# Patient Record
Sex: MALE | Race: WHITE | Employment: OTHER | URBAN - METROPOLITAN AREA
[De-identification: names, ages, dates, MRNs, and addresses within clinical notes are randomized per-mention and may not be internally consistent; named-entity substitution may affect disease eponyms.]

---

## 2019-03-04 ENCOUNTER — TRANSFERRED RECORDS (OUTPATIENT)
Dept: HEALTH INFORMATION MANAGEMENT | Facility: CLINIC | Age: 64
End: 2019-03-04

## 2019-03-20 ENCOUNTER — TRANSFERRED RECORDS (OUTPATIENT)
Dept: HEALTH INFORMATION MANAGEMENT | Facility: CLINIC | Age: 64
End: 2019-03-20

## 2019-03-25 ENCOUNTER — TRANSFERRED RECORDS (OUTPATIENT)
Dept: HEALTH INFORMATION MANAGEMENT | Facility: CLINIC | Age: 64
End: 2019-03-25

## 2019-04-24 ENCOUNTER — PRE VISIT (OUTPATIENT)
Dept: OPHTHALMOLOGY | Facility: CLINIC | Age: 64
End: 2019-04-24

## 2019-04-24 ENCOUNTER — DOCUMENTATION ONLY (OUTPATIENT)
Dept: CARE COORDINATION | Facility: CLINIC | Age: 64
End: 2019-04-24

## 2019-04-24 NOTE — TELEPHONE ENCOUNTER
FUTURE VISIT INFORMATION      FUTURE VISIT INFORMATION:    Date: 5/15/19    Time: 8:30am    Location: [facility]  REFERRAL INFORMATION:    Referring provider:  Miriam Hensley    Referring providers clinic:  [facility]    Reason for visit/diagnosis  Visual disturbance    RECORDS REQUESTED FROM:       Clinic name Comments Records Status Imaging Status   [facility] Request for records faxed 4/24- received and sent to scanning [facility]

## 2019-05-15 ENCOUNTER — OFFICE VISIT (OUTPATIENT)
Dept: OPHTHALMOLOGY | Facility: CLINIC | Age: 64
End: 2019-05-15
Payer: MEDICARE

## 2019-05-15 DIAGNOSIS — H53.40 VISUAL FIELD DEFECT: ICD-10-CM

## 2019-05-15 DIAGNOSIS — H53.10 SUBJECTIVE VISUAL DISTURBANCE: Primary | ICD-10-CM

## 2019-05-15 DIAGNOSIS — H25.13 NUCLEAR SCLEROSIS OF BOTH EYES: Primary | ICD-10-CM

## 2019-05-15 DIAGNOSIS — H53.10 SUBJECTIVE VISUAL DISTURBANCE: ICD-10-CM

## 2019-05-15 RX ORDER — TAMSULOSIN HYDROCHLORIDE 0.4 MG/1
0.4 CAPSULE ORAL DAILY
COMMUNITY
End: 2020-08-04

## 2019-05-15 RX ORDER — DIAZEPAM 5 MG
5 TABLET ORAL EVERY 6 HOURS PRN
COMMUNITY

## 2019-05-15 RX ORDER — SIMVASTATIN 40 MG
40 TABLET ORAL AT BEDTIME
COMMUNITY

## 2019-05-15 RX ORDER — ZOLPIDEM TARTRATE 10 MG/1
10 TABLET ORAL
COMMUNITY

## 2019-05-15 ASSESSMENT — VISUAL ACUITY
CORRECTION_TYPE: GLASSES
OS_CC: 20/20
OD_CC: 20/20
OD_CC: 20/20
OS_CC: 20/20
METHOD: SNELLEN - LINEAR

## 2019-05-15 ASSESSMENT — REFRACTION_WEARINGRX
OD_CYLINDER: +0.75
OS_CYLINDER: +1.00
OS_AXIS: 180
OD_AXIS: 002
OS_SPHERE: -0.75
OD_ADD: +2.50
OD_SPHERE: -0.50
SPECS_TYPE: LAST MR IN PHOROPTER
OS_ADD: +2.50

## 2019-05-15 ASSESSMENT — SLIT LAMP EXAM - LIDS
COMMENTS: MILD BLEPHARITIS
COMMENTS: MILD BLEPHARITIS

## 2019-05-15 ASSESSMENT — TONOMETRY
IOP_METHOD: ICARE
OD_IOP_MMHG: 13
OS_IOP_MMHG: 11

## 2019-05-15 ASSESSMENT — CONF VISUAL FIELD
OS_NORMAL: 1
OD_NORMAL: 1
METHOD: COUNTING FINGERS

## 2019-05-15 ASSESSMENT — CUP TO DISC RATIO
OD_RATIO: 0.3
OS_RATIO: 0.3

## 2019-05-15 NOTE — LETTER
5/15/2019         RE:  :  MRN: Jose Antonio Talavera  1955  [id number]     Dear Dr. Miriam Hensley,    Thank you for asking me to see your very pleasant patient, Jose Antonio Talavera, in neuro-ophthalmic consultation.  I would like to thank you for sending your records and I have summarized them in the history of present illness. He presented with his {Family Member:[id number]} who provided additional history.  My assessment and plan are below.  For further details, please see my attached clinic note.           Again, thank you for allowing me to participate in the care of your patient.      Sincerely,    Brian Lee MD  Professor  Ophthalmology Residency   Director of Neuro-Ophthalmology  [facility] Chair  Departments of Ophthalmology, Neurology, and Neurosurgery  [address]  [address]  T - [phone]  F - [phone]  [email or web address]      CC: Miriam Hensley MD  [facility]  [address]  [facility] [address]  VIA Facsimile: [phone]     Pedro Luis Garza DO  [facility]  [address]  [facility] [address]  VIA Facsimile: [phone]       DX = ***

## 2019-05-15 NOTE — LETTER
5/15/2019         RE:  :  MRN: Jose Antonio Talavera  1955  [id number]     Dear Dr. Hensley,    Thank you for asking me to see your very pleasant patient, Jose Antonio Talavera, in neuro-ophthalmic consultation.  I would like to thank you for sending your records and I have summarized them in the history of present illness.  My assessment and plan are below.  For further details, please see my attached clinic note.         Assessment & Plan     Jose Antonio Talavera is a 64 year old male with the following diagnoses:   1. Nuclear sclerosis of both eyes    2. Visual field defect    3. Subjective visual disturbance       He developed ringing in the ears in mid January.  A couple of weeks later he developed lightheadedness.  A few weeks later has noted blurred vision.  His vision in the distance seems better than before. This was without glasses.  He was having difficulty seeing near.  He got a new pair orthophoric glasses which don't make much difference.  He cannot remember seeing so well into the distance.  However, his near vision got worse.  This seemed to get worse for a few months and has been stable for the past 6 weeks or so.  He seems sensitive to sun and fluorescent lights.      Visual acuity 20/20 both eyes.  Pupils normal with no afferent pupillary defect both eyes.  color vision normal both eyes.  Anterior segment exam shows clear LASIK flap both eyes.  Lens with 1-2+ nuclear sclerosis both eyes.  He also has mild blepharitis both eyes.  Fundus exam normal both eyes. Optical coherence tomography within normal limits both eyes.  Visual field shows nonspecific defects right and normal left eye.      It is my impression that patient has blurred vision.  We discussed possible etiologies including regressive of previous refractive surgery versus cataracts.  He is also having difficulty adjusting to his progressive lenses.  Could consider cataract surgery in the future if becomes more bothersome.  I don't see a  connection with his dizziness and tinnitus.  He does not have nystagmus and denies oscillopsia.       Again, thank you for allowing me to participate in the care of your patient.      Sincerely,    Brian Lee MD  Professor  Ophthalmology Residency   Director of Neuro-Ophthalmology  [facility]  Departments of Ophthalmology, Neurology, and Neurosurgery  [facility] [address] [place]  [address]  T - [phone]  F - [phone]  [email or web address]      CC: Miriam Hensley MD  [facility]  [address] [address]  VIA Facsimile: [phone]     Pedro Luis Garza, DO  [facility]  [address]  [facility] [address]  VIA Facsimile: [phone]

## 2019-05-15 NOTE — PROGRESS NOTES
Assessment & Plan     Jose Antonio Talavera is a 64 year old male with the following diagnoses:   1. Nuclear sclerosis of both eyes    2. Visual field defect    3. Subjective visual disturbance       He developed ringing in the ears in mid January.  A couple of weeks later he developed lightheadedness.  A few weeks later has noted blurred vision.  His vision in the distance seems better than before. This was without glasses.  He was having difficulty seeing near.  He got a new pair orthophoric glasses which don't make much difference.  He cannot remember seeing so well into the distance.  However, his near vision got worse.  This seemed to get worse for a few months and has been stable for the past 6 weeks or so.  He seems sensitive to sun and fluorescent lights.      Visual acuity 20/20 both eyes.  Pupils normal with no afferent pupillary defect both eyes.  color vision normal both eyes.  Anterior segment exam shows clear LASIK flap both eyes.  Lens with 1-2+ nuclear sclerosis both eyes.  He also has mild blepharitis both eyes.  Fundus exam normal both eyes. Optical coherence tomography within normal limits both eyes.  Visual field shows nonspecific defects right and normal left eye.      It is my impression that patient has blurred vision.  We discussed possible etiologies including regressive of previous refractive surgery versus cataracts.  He is also having difficulty adjusting to his progressive lenses.  Could consider cataract surgery in the future if becomes more bothersome.  I don't see a connection with his dizziness and tinnitus.  He does not have nystagmus and denies oscillopsia.            Attending Physician Attestation:  Complete documentation of historical and exam elements from today's encounter can be found in the full encounter summary report (not reduplicated in this progress note).  I personally obtained the chief complaint(s) and history of present illness.  I confirmed and edited as  necessary the review of systems, past medical/surgical history, family history, social history, and examination findings as documented by others; and I examined the patient myself.  I personally reviewed the relevant tests, images, and reports as documented above.  I formulated and edited as necessary the assessment and plan and discussed the findings and management plan with the patient and family. - Brian Lee MD

## 2019-05-15 NOTE — NURSING NOTE
Chief Complaints and History of Present Illnesses   Patient presents with     Blurred Vision Evaluation     With pulsatile synchronous tinnitus     Chief Complaint(s) and History of Present Illness(es)     Blurred Vision Evaluation     Laterality: both eyes    Onset: gradual    Onset: 4 months ago    Quality: blurred vision and difficulty focusing    Frequency: intermittently    Associated symptoms: photophobia, nausea and headache (in the back of the skull per pt).  Negative for double vision, flashes, floaters, eye pain, scalp tenderness and fatigue    Comments: With pulsatile synchronous tinnitus              Comments     Patient notes that vision has changed he was unable to see in the distance, now he is able to, but now unable to 'focus at near', first noticed when driving, but felt that eyes weren't working together, felt depth perception was off somehow, causes nausea, lightheadedness, denies dizziness or vertigo, notes that it is different every day. Patient has been to  for PT, has been doing 'pencil pushups', unsure if it helps, can feel it working his muscle.  No history of head injury.     Jyothi Reyna May 15, 2019 7:58 AM

## 2019-12-22 ENCOUNTER — HOSPITAL ENCOUNTER (EMERGENCY)
Facility: CLINIC | Age: 64
Discharge: HOME | End: 2019-12-22
Attending: EMERGENCY MEDICINE | Admitting: EMERGENCY MEDICINE
Payer: MEDICARE

## 2019-12-22 ENCOUNTER — OFFICE VISIT (OUTPATIENT)
Dept: OPHTHALMOLOGY | Facility: CLINIC | Age: 64
End: 2019-12-22

## 2019-12-22 VITALS
HEART RATE: 65 BPM | TEMPERATURE: 98.2 F | SYSTOLIC BLOOD PRESSURE: 135 MMHG | RESPIRATION RATE: 16 BRPM | HEIGHT: 68 IN | WEIGHT: 175 LBS | OXYGEN SATURATION: 98 % | BODY MASS INDEX: 26.52 KG/M2 | DIASTOLIC BLOOD PRESSURE: 92 MMHG

## 2019-12-22 DIAGNOSIS — H43.813 PVD (POSTERIOR VITREOUS DETACHMENT), BILATERAL: Primary | ICD-10-CM

## 2019-12-22 DIAGNOSIS — H25.13 NUCLEAR SCLEROSIS OF BOTH EYES: ICD-10-CM

## 2019-12-22 DIAGNOSIS — Z98.890 HX OF LASIK: ICD-10-CM

## 2019-12-22 DIAGNOSIS — H53.19 PHOTOPSIA: ICD-10-CM

## 2019-12-22 DIAGNOSIS — H43.391 VITREOUS FLOATERS OF RIGHT EYE: ICD-10-CM

## 2019-12-22 PROCEDURE — 99284 EMERGENCY DEPT VISIT MOD MDM: CPT | Mod: 25

## 2019-12-22 PROCEDURE — 76512 OPH US DX B-SCAN: CPT | Mod: RT

## 2019-12-22 RX ORDER — PROPARACAINE HYDROCHLORIDE 5 MG/ML
1 SOLUTION/ DROPS OPHTHALMIC ONCE
Status: DISCONTINUED | OUTPATIENT
Start: 2019-12-22 | End: 2019-12-22 | Stop reason: HOSPADM

## 2019-12-22 ASSESSMENT — CONF VISUAL FIELD
OS_NORMAL: 1
OD_NORMAL: 1

## 2019-12-22 ASSESSMENT — ENCOUNTER SYMPTOMS: EYE PAIN: 0

## 2019-12-22 ASSESSMENT — CUP TO DISC RATIO
OS_RATIO: 0.3
OD_RATIO: 0.3

## 2019-12-22 ASSESSMENT — SLIT LAMP EXAM - LIDS
COMMENTS: MILD BLEPHARITIS
COMMENTS: MILD BLEPHARITIS

## 2019-12-22 ASSESSMENT — VISUAL ACUITY
METHOD: SNELLEN - LINEAR
OD_SC: 20/20
OS_SC: 20/20
OS_SC+: -1

## 2019-12-22 ASSESSMENT — TONOMETRY
IOP_METHOD: APPLANATION
OD_IOP_MMHG: 13
OS_IOP_MMHG: 15

## 2019-12-22 ASSESSMENT — MIFFLIN-ST. JEOR: SCORE: 1553.29

## 2019-12-22 ASSESSMENT — EXTERNAL EXAM - LEFT EYE: OS_EXAM: NORMAL

## 2019-12-22 ASSESSMENT — EXTERNAL EXAM - RIGHT EYE: OD_EXAM: NORMAL

## 2019-12-22 NOTE — ED PROVIDER NOTES
"History     Chief Complaint:  Eye Problem     HPI  Jose Antonio Talavera is a 64 year old male with history of lasik who presents today with eye problem. The patient states that for the last 24 hours he has noticed intermittent flashing in his right upper quadrant of the right eye, similar to a \"camera-flash\" that lasts a brief second. He states associated floaters in his eye that are different in size. He states his symptoms exacerbates with standing up and are relieved with laying down. He states his hypertension is newly controlled and is still being monitored for medication effectiveness. He denies other trouble with vision, eye pain or eye trauma.     Allergies:  Ciprofloxacin  Sulfa Drugs   Lisinopril       Losartan        Medications:    Valium  Zocor  Flomax  Ambien    Past Medical History:    Acute adjustment disorder  Hyperglycemia   BPH (benign prostatic hyperplasia)   Insomnia   Upper limb amputation  Hyperlipidemia   Lumbago   Diverticulosis  Hernia    Past Surgical History:    Lasik   Tonsillectomy & Adenoidectomy   Hernia repair  Radial keratotomy     Family History:    Father - lung cancer  Mother - breast cancer, hyperlipidemia  Sister - breast cancer    Social History:  The patient was accompanied to the ED alone.  Smoking Status: Never  Smokeless Tobacco: Never  Alcohol Use: Yes   PCP: Pedro Luis Garza   Marital Status:     Review of Systems   Eyes: Positive for visual disturbance. Negative for pain.   All other systems reviewed and are negative.       Physical Exam     Patient Vitals for the past 24 hrs:   BP Temp Temp src Pulse Heart Rate Resp SpO2 Height Weight   12/22/19 1110 (!) 135/92 -- -- 65 -- 16 -- -- --   12/22/19 0944 (!) 178/90 98.2  F (36.8  C) Temporal 62 62 16 98 % 1.727 m (5' 8\") 79.4 kg (175 lb)        Physical Exam   Gen:  Pleasant, appears stated age.     Eye:    VA: OD 20/20, OS 20/25,  Visual fields intact.  Lashes - normal  Lids - Palpebral conjunctiva not inflamed. "   Sclera - not injected  Pupils - are equal, round, and reactive.  No afferent pupillary defect.    Musculoskeletal:     Normal movement of all extremities without evidence for deficit.    Extremities:    No edema.  Atraumatic.      Skin:   Warm and dry.  No rash.    Neurologic:    Non-focal exam without asymmetric weakness or numbness.    Fluent speech.    Psychiatric:     Normal affect with appropriate interaction with examiner.        Emergency Department Course     Imaging:  Radiology results were communicated with the patient who voiced understanding of the findings.    POC US Soft Tissue:  INTERPRETATION: Normal US of the Right Eye.   The lens location and retina appeared normal.  No posterior chamber hemorrhage was visualized.  No foreign bodies seen, as per Daisha Kitchen       Procedures:    Limited Bedside Screening Ultrasound     PERFORMED BY: Daisha Kitchen  BODY AREA IMAGED: right eye  INDICATIONS: floaters and flashes of light  FINDINGS: Normal US of the Right Eye.   The lens location and retina appeared normal.  No posterior chamber hemorrhage was visualized.  No foreign bodies seen     Interventions:  1042 Alcaine 1 drop right eye     Emergency Department Course:  Nursing notes and vitals reviewed. (9:54 AM) I performed an exam of the patient as documented above.     Medicine administered as documented above.    The patient was sent for US while in the emergency department, results above.     1025 Eye exam was performed, for details please see exam section above. A bedside US was performed as outlined in the procedure note above. The patient tolerated well and there were no complications.    1048 I consulted with Dr. Steward, ophthalmology, regarding the patient's history and presentation here in the emergency department.    1059 I rechecked the patient and discussed the results of their workup thus far.     Findings and plan explained to the Patient. Patient discharged home with  instructions regarding supportive care, medications, and reasons to return. The importance of close follow-up was reviewed.     Impression & Plan      Medical Decision Making:  Jose Antonio Turner is a 64-year-old male with history of previous Lasix surgery, hypertension, who presents with about 24 hours of flashing lights in the right upper outer quadrant of the right eye and some associated floaters.  The patient has intact visual acuity.  Bedside ultrasound does not demonstrate any obvious vitreous hemorrhage, retinal detachment, lens dislocation, or other dangerous condition.  Limited funduscopic exam shows normal optic nerve but otherwise I cannot comment further on retinal appearance.  I discussed the case with the on-call ophthalmologist, Dr. Yen godoy.  Fortunately, he feels that he is able to see the patient in ophthalmology clinic today at the [facility].  I discussed the plan with the patient who is very motivated to have this evaluated by ophthalmology today to definitively rule out retinal detachment.  He was provided with directions to the ophthalmology clinic where he is actually been before for LASIK surgery.  His up-to-date phone number was confirmed.  He is to return to the ED if the plan fails.    Diagnosis:    ICD-10-CM    1. Vitreous floaters of right eye H43.391       Disposition:  Discharged to home.     Scribe Disclosure:  MARIYA, Tab Esteban, am serving as a scribe at 9:54 AM on 12/22/2019 to document services personally performed by Daisha Liu MD based on my observations and the provider's statements to me.      12/22/2019    EMERGENCY DEPARTMENT      Daisha Liu MD  12/22/19 2718

## 2019-12-22 NOTE — ED TRIAGE NOTES
For 24 hours has had flashing light occurring in the right eye. No other trouble with vision, no headaches. No prior Hx of same  Left eye 20/25    Right eye 20/20 in triage

## 2019-12-22 NOTE — ED AVS SNAPSHOT
Emergency Department  [address] [place] [address]  Phone:  [phone]  Fax:  [phone]                                    Jose Antonio Talavera   MRN: [id number]    Department:   Emergency Department   Date of Visit:  12/22/2019           After Visit Summary Signature Page    I have received my discharge instructions, and my questions have been answered. I have discussed any challenges I see with this plan with the nurse or doctor.    ..........................................................................................................................................  Patient/Patient Representative Signature      ..........................................................................................................................................  Patient Representative Print Name and Relationship to Patient    ..................................................               ................................................  Date                                   Time    ..........................................................................................................................................  Reviewed by Signature/Title    ...................................................              ..............................................  Date                                               Time          22EPIC Rev 08/18

## 2019-12-22 NOTE — PATIENT INSTRUCTIONS
Patient Education     What Are Flashes and Floaters?  Have you ever seen flashes of light, stars, or streaks that aren t really there? A few of these flashes are seen by everyone from time to time. Usually you see them in one eye at a time. Flashes are often caused by the gel filling inside of your eye, called the vitreous, pulling on the retina. The retina is a membrane that lines the inside of your eye.  Floaters look like dark specks, clouds, threads, or spider webs moving through your eyesight. Most people see them once in a while. Floaters may be pieces of gel or other material floating inside your eye. They are usually harmless.      Who gets flashes?  As you age or if you are nearsighted, you are more likely to see flashes. Nearsightedness is when you have fuzzy distance vision. Sometimes, flashes are a sign of other eye problems that need care.  Who gets floaters?  The older you get, the more likely you ll notice floaters. Floaters can also be caused by an eye injury or surgery. People who are very nearsighted may get more floaters. If floaters appear suddenly or greatly increase in number, see your healthcare provider. This may be a sign of an eye problem.  Date Last Reviewed: 10/1/2017    3634-6891 [employer]. [address], [place] [address]. All rights reserved. This information is not intended as a substitute for professional medical care. Always follow your healthcare professional's instructions.

## 2019-12-22 NOTE — PROGRESS NOTES
Chief Complaint(s) and History of Present Illness(es)     Flashes Evaluation            Comments     Mr. Talavera is a 64M with a history of LASIK, formerly a high myope,   presenting with new onset of flashes of light and floaters in his right   eye.  The flashes began 1030 AM yesterday and have been occurring about   every half hour.  The appear in his upper right field of vision.  They are   associated with new floaters.  No visual field changes.  No other vision   changes or loss noted.  No known eye trauma.      He has a history of LASIK in 2000 and is suspected to have been formerly a   high myope.           Review of systems for the eyes was negative other than the pertinent positives/negatives listed in the HPI.      Assessment & Plan      Jose Antonio Talavera is a 64 year old male with the following diagnoses:   1. PVD (posterior vitreous detachment), bilateral    2. Photopsia    3. Nuclear sclerosis of both eyes    4. Hx of LASIK       BCVA 20/20  CVF full  [place] negative  +PVD both eyes  No breaks/tears/holes/retinal detachment on 360 SD exam bilaterally    PLAN:  - Follow up in 4 weeks for repeat DFE with scleral depression  - Continue artificial tears as needed  - Return precautions discussed at length      Patient disposition:   Return in about 4 weeks (around 1/19/2020) for Follow Up, Vision, Pressure, Dilation, scleral depression.         Wilfred Steward, PGY2  Ophthalmology Resident

## 2019-12-23 ENCOUNTER — TELEPHONE (OUTPATIENT)
Dept: OPHTHALMOLOGY | Facility: CLINIC | Age: 64
End: 2019-12-23

## 2019-12-23 NOTE — TELEPHONE ENCOUNTER
Health Call Center    Phone Message    May a detailed message be left on voicemail: yes    Reason for Call: Symptoms or Concerns     If patient has red-flag symptoms, warm transfer to triage line    Current symptom or concern: Floaters in right eye    Symptoms have been present for: Less than 12 hour(s)    Has patient previously been seen for this? Yes    By: Dr. Steward    Date: Seen 12/22/2019    Are there any new or worsening symptoms? Yes: Pt states he arrived at work this morning and turned on his computer, and he is seeing floaters in his right eye. He states he only sees them when he looks at something with a white background, but was concerned. He was seen with provider yesterday after being in the ER, and was told to watch out for these symptoms. Writer offered appt in clinic, pt declined and asked for a call from nursing. Please call pt to discuss.    Action Taken: Message routed to:  [facility] ([facility] Ophthalmology

## 2019-12-23 NOTE — TELEPHONE ENCOUNTER
I spoke to the patient who notes that he notices right eye floaters when he looks at his computer this morning.  When I called him back he said they were no longer noticeable.  He was seen yesterday.  I offered him an appointment today if he was concerned but he decided to wait on it and call us if symptoms present again.

## 2020-01-16 ENCOUNTER — OFFICE VISIT (OUTPATIENT)
Dept: OPHTHALMOLOGY | Facility: CLINIC | Age: 65
End: 2020-01-16
Payer: MEDICARE

## 2020-01-16 DIAGNOSIS — H25.13 NUCLEAR SCLEROSIS OF BOTH EYES: ICD-10-CM

## 2020-01-16 DIAGNOSIS — Z98.890 HX OF LASIK: ICD-10-CM

## 2020-01-16 DIAGNOSIS — H43.813 PVD (POSTERIOR VITREOUS DETACHMENT), BILATERAL: Primary | ICD-10-CM

## 2020-01-16 DIAGNOSIS — H21.233 PDS (PIGMENTARY DISPERSION SYNDROME), BILATERAL: ICD-10-CM

## 2020-01-16 PROCEDURE — G0463 HOSPITAL OUTPT CLINIC VISIT: HCPCS | Mod: ZF

## 2020-01-16 RX ORDER — ATENOLOL 25 MG/1
50 TABLET ORAL DAILY
COMMUNITY
Start: 2020-01-07

## 2020-01-16 ASSESSMENT — CONF VISUAL FIELD
OS_NORMAL: 1
OD_NORMAL: 1
METHOD: COUNTING FINGERS

## 2020-01-16 ASSESSMENT — CUP TO DISC RATIO
OD_RATIO: 0.3
OS_RATIO: 0.3

## 2020-01-16 ASSESSMENT — VISUAL ACUITY
OD_SC: 20/20
OS_SC+: -1
OD_SC+: -1
METHOD: SNELLEN - LINEAR
OS_SC: 20/20

## 2020-01-16 ASSESSMENT — SLIT LAMP EXAM - LIDS
COMMENTS: MILD BLEPHARITIS
COMMENTS: MILD BLEPHARITIS

## 2020-01-16 ASSESSMENT — TONOMETRY
OS_IOP_MMHG: 13
IOP_METHOD: ICARE
OD_IOP_MMHG: 12

## 2020-01-16 ASSESSMENT — EXTERNAL EXAM - LEFT EYE: OS_EXAM: NORMAL

## 2020-01-16 ASSESSMENT — EXTERNAL EXAM - RIGHT EYE: OD_EXAM: NORMAL

## 2020-01-16 NOTE — PROGRESS NOTES
Chief Complaint(s) and History of Present Illness(es)     Follow Up     Laterality: both eyes    Quality: blurred vision    Severity: mild    Context: near vision    Associated symptoms: floaters.  Negative for flashes and eye pain    Treatments tried: artificial tears    Response to treatment: mild improvement    Pain scale: 0/10          Comments     4wk bilateral PVD recheck   Vision seems to have changed since LV, c/o difficulties with near sight.     Floaters OD are stable but episodes of flashes have stopped.     No additional comments or concerns.     Tracey Freeman COT 12:15 PM January 16, 2020         Review of systems for the eyes was negative other than the pertinent positives/negatives listed in the HPI.      Assessment & Plan      Jose Antonio Talavera is a 64 year old male with the following diagnoses:   1. PVD (posterior vitreous detachment), bilateral    2. Nuclear sclerosis of both eyes    3. Hx of LASIK - Both Eyes    4. PDS (pigmentary dispersion syndrome), bilateral       BCVA 20/20  CVF full  [place] negative  +PVD both eyes  No breaks/tears/holes/retinal detachment on 360 SD exam bilaterally     PLAN:  - Return for annual visit or sooner if floater in right eye is continually bothersome can call for YAG vit evaluation  - Continue artificial tears as needed  - Return precautions discussed at length    Patient disposition:   Return in about 1 year (around 1/16/2021) for Vision, Pressure, Refraction, Dilation.       Wilfred Steward, PGY2  Ophthalmology Resident    Attending Physician Attestation:  Complete documentation of historical and exam elements from today's encounter can be found in the full encounter summary report (not reduplicated in this progress note).  I personally obtained the chief complaint(s) and history of present illness.  I confirmed and edited as necessary the review of systems, past medical/surgical history, family history, social history, and examination findings as documented by  others; and I examined the patient myself.  I personally reviewed the relevant tests, images, and reports as documented above.  I formulated and edited as necessary the assessment and plan and discussed the findings and management plan with the patient and family. . - Taj Sharif MD

## 2020-01-16 NOTE — PATIENT INSTRUCTIONS
Artificial tears as needed  Call if you develop sudden vision loss, dark areas in your vision, prominent new floaters or flashes  Call if you want evaluation for lasering of floaters if still bothersome after several months

## 2020-01-16 NOTE — NURSING NOTE
Chief Complaints and History of Present Illnesses   Patient presents with     Follow Up     Chief Complaint(s) and History of Present Illness(es)     Follow Up     Laterality: both eyes    Quality: blurred vision    Severity: mild    Context: near vision    Associated symptoms: floaters.  Negative for flashes and eye pain    Treatments tried: artificial tears    Response to treatment: mild improvement    Pain scale: 0/10              Comments     4wk bilateral PVD recheck   Vision seems to have changed since LV, c/o difficulties with near sight.     Floaters OD are stable but episodes of flashes have stopped.     No additional comments or concerns.     Tracey Freeman COT 12:15 PM January 16, 2020

## 2020-03-11 ENCOUNTER — HEALTH MAINTENANCE LETTER (OUTPATIENT)
Age: 65
End: 2020-03-11

## 2020-08-04 ENCOUNTER — OFFICE VISIT (OUTPATIENT)
Dept: OPHTHALMOLOGY | Facility: CLINIC | Age: 65
End: 2020-08-04
Payer: MEDICARE

## 2020-08-04 DIAGNOSIS — H04.123 DRY EYE SYNDROME OF BOTH EYES: Primary | ICD-10-CM

## 2020-08-04 DIAGNOSIS — H43.813 PVD (POSTERIOR VITREOUS DETACHMENT), BILATERAL: ICD-10-CM

## 2020-08-04 PROCEDURE — G0463 HOSPITAL OUTPT CLINIC VISIT: HCPCS | Mod: ZF

## 2020-08-04 ASSESSMENT — CUP TO DISC RATIO
OD_RATIO: 0.3
OS_RATIO: 0.3

## 2020-08-04 ASSESSMENT — TONOMETRY
OD_IOP_MMHG: 13
OS_IOP_MMHG: 11
IOP_METHOD: TONOPEN

## 2020-08-04 ASSESSMENT — VISUAL ACUITY
OS_SC: 20/20
OS_SC+: -1
OD_SC: 20/20
METHOD: SNELLEN - LINEAR

## 2020-08-04 ASSESSMENT — EXTERNAL EXAM - RIGHT EYE: OD_EXAM: NORMAL

## 2020-08-04 ASSESSMENT — CONF VISUAL FIELD
OD_NORMAL: 1
METHOD: COUNTING FINGERS
OS_NORMAL: 1

## 2020-08-04 ASSESSMENT — EXTERNAL EXAM - LEFT EYE: OS_EXAM: NORMAL

## 2020-08-04 NOTE — PROGRESS NOTES
CC:  Film over vision in right eye      HPI:  65M with a history of LASIK p/w weeks to months of a film over the vision in his right eye.  It comes and goes, improving with blinking or artificial tears but only temporarily, and then it returns to his vision.  No significant pain or irritation, although he states that after his AT's wear off, the eyes sometimes feel dryer than they did before.  He states that this doesn't look like the floaters from his PVD.  His floaters resolved after PVD in December/January.  It is worst in mid-lighting environments but is never present in very bright or dark environments.       Review of systems for the eyes was negative other than the pertinent positives/negatives listed in the HPI.      Agree with technician note.    Assessment & Plan      Jose Antonio Talavera is a 65 year old male with the following diagnoses:   1. Dry eye syndrome of both eyes    2. PVD (posterior vitreous detachment), bilateral       Noticing a film blurring vision in the right eye, clears with blinking, improves with artificial tears.  VA 20/20 ou. Mucousy-debris laden tear film.  Mild gland inspissation with telangiectasias.      - Increase artificial tears to 4x/day  - Begin warm compresses 2x/day  - Begin lid hygiene daily with baby shampoo  - Begin fish oil 2000 mg/day  - Next steps could be doxy v. punctal plugs.  Favor doxy with inspissated glands with mild lid telangiectasias.  Did well with punctal plugs after LASIK a number of years prior  - Return precautions discussed    Patient disposition:   Return in about 5 weeks (around 9/8/2020) for Follow Up 4-6 weeks surface check.       Wilfred Steward, PGY2  Ophthalmology Resident    Attending Physician Attestation:  Complete documentation of historical and exam elements from today's encounter can be found in the full encounter summary report (not reduplicated in this progress note).  I personally obtained the chief complaint(s) and history of present  illness.  I confirmed and edited as necessary the review of systems, past medical/surgical history, family history, social history, and examination findings as documented by others; and I examined the patient myself.  I personally reviewed the relevant tests, images, and reports as documented above.  I formulated and edited as necessary the assessment and plan and discussed the findings and management plan with the patient and family. . - Taj Sharif MD

## 2020-08-04 NOTE — PATIENT INSTRUCTIONS
- Increase artificial tears to 4x/day  - Begin warm compresses 2x/day  - Begin lid hygiene daily with baby shampoo  - Begin Krill oil 1g/day

## 2020-08-04 NOTE — NURSING NOTE
Chief Complaints and History of Present Illnesses   Patient presents with     Vision Changes Od     Chief Complaint(s) and History of Present Illness(es)     Vision Changes Od     Laterality: right eye    Course: stable    Associated symptoms: floaters.  Negative for dryness, eye pain, flashes, discharge and tearing    Treatments tried: artificial tears    Pain scale: 0/10              Comments     Pt complains of having a film like spot in vision right RE - notes he can blink it away but then it comes back.  Notes he doesn't notice it when he is in the dark or out in bright sunlight.  Hx of floaters- unchanged  Denies any flashes, pain, pressure, irritation, discharge, and tearing.  Ocular meds: AT's PRN BE - has some relief for a few seconds.    Kyung Holcomb OT 1:25 PM August 4, 2020

## 2020-09-21 ENCOUNTER — OFFICE VISIT (OUTPATIENT)
Dept: OPHTHALMOLOGY | Facility: CLINIC | Age: 65
End: 2020-09-21
Payer: MEDICARE

## 2020-09-21 DIAGNOSIS — H04.123 DRY EYE SYNDROME OF BOTH EYES: Primary | ICD-10-CM

## 2020-09-21 DIAGNOSIS — Z98.890 HX OF LASIK: ICD-10-CM

## 2020-09-21 DIAGNOSIS — H43.813 PVD (POSTERIOR VITREOUS DETACHMENT), BILATERAL: ICD-10-CM

## 2020-09-21 DIAGNOSIS — H25.13 NUCLEAR SCLEROSIS OF BOTH EYES: ICD-10-CM

## 2020-09-21 PROCEDURE — G0463 HOSPITAL OUTPT CLINIC VISIT: HCPCS | Mod: ZF

## 2020-09-21 RX ORDER — NEOMYCIN SULFATE, POLYMYXIN B SULFATE, AND DEXAMETHASONE 3.5; 10000; 1 MG/G; [USP'U]/G; MG/G
OINTMENT OPHTHALMIC
Qty: 1 TUBE | Refills: 3 | Status: SHIPPED | OUTPATIENT
Start: 2020-09-21 | End: 2021-01-08

## 2020-09-21 ASSESSMENT — VISUAL ACUITY
OD_SC+: -1
OS_SC+: -2
METHOD: SNELLEN - LINEAR
OS_SC: 20/20
OD_SC: 20/15

## 2020-09-21 ASSESSMENT — REFRACTION_WEARINGRX
OD_CYLINDER: +0.75
OS_SPHERE: -0.75
OD_SPHERE: -0.50
OS_ADD: +2.50
OD_AXIS: 002
OS_AXIS: 180
OD_ADD: +2.50
SPECS_TYPE: LAST MR IN PHOROPTER
OS_CYLINDER: +1.00

## 2020-09-21 ASSESSMENT — CONF VISUAL FIELD
OS_NORMAL: 1
OD_NORMAL: 1
METHOD: COUNTING FINGERS

## 2020-09-21 ASSESSMENT — TONOMETRY
IOP_METHOD: ICARE
OD_IOP_MMHG: 09
OS_IOP_MMHG: 09

## 2020-09-21 ASSESSMENT — EXTERNAL EXAM - RIGHT EYE: OD_EXAM: NORMAL

## 2020-09-21 ASSESSMENT — EXTERNAL EXAM - LEFT EYE: OS_EXAM: NORMAL

## 2020-09-21 NOTE — NURSING NOTE
Chief Complaints and History of Present Illnesses   Patient presents with     Follow Up     7 week follow up Dry eye syndrome of both eyes          Chief Complaint(s) and History of Present Illness(es)     Follow Up     Laterality: both eyes    Comments: 7 week follow up Dry eye syndrome of both eyes                   Comments     Pt states vision is the same as last visit. No eye pain today.  No redness. No dryness.   No new flashes or floaters.    MARCELA Villa September 21, 2020 9:15 AM

## 2020-09-21 NOTE — PATIENT INSTRUCTIONS
- Continue artificial tears to 4x/day  - Continue warm compresses 2x/day  - Continue lid hygiene daily with baby shampoo  - Continue Krill oil 1000 mg/day  - Begin maxitrol ointment at bedtime.

## 2020-09-21 NOTE — PROGRESS NOTES
CC:  Film over vision in right eye      Interval History:  He hasn't noticed a change in his symptoms since the last visit.  Still describing a film floating over the vision in both eyes.  He has floaters but feels these are different.  Worst in the mornings.  Using AT's 4x/day (inconsistently), WC's 2x/day, lid hygiene, Krill oil 1 g daily.    HPI:  65M with a history of LASIK p/w weeks to months of a film over the vision in his right eye.  It comes and goes, improving with blinking or artificial tears but only temporarily, and then it returns to his vision.  No significant pain or irritation, although he states that after his AT's wear off, the eyes sometimes feel dryer than they did before.  He states that this doesn't look like the floaters from his PVD.  His floaters resolved after PVD in December/January.  It is worst in mid-lighting environments but is never present in very bright or dark environments.       Review of systems for the eyes was negative other than the pertinent positives/negatives listed in the HPI.      Agree with technician note.    Assessment & Plan      Jose Antonio Talavera is a 65 year old male with the following diagnoses:     Encounter Diagnoses   Name Primary?     Dry eye syndrome of both eyes Yes     PVD (posterior vitreous detachment), bilateral      Nuclear sclerosis of both eyes      Hx of LASIK - Both Eyes      Noticing a film blurring vision in the right eye, clears with blinking, improves with artificial tears.  VA 20/20 ou. Mucousy-debris laden tear film.  Mild gland inspissation with telangiectasias, and now with injection despite adding WC's and krill oil.      - Continue artificial tears to 4x/day  - Continue warm compresses 2x/day  - Continue lid hygiene daily with baby shampoo  - Continue Krill oil 1000 mg/day  - Begin maxitrol ointment at bedtime.  If still unimproved, consider adding doxy v. punctal plugs.  Favor doxy with inspissated glands with mild lid telangiectasias.   Did well with punctal plugs after LASIK a number of years prior  - Return precautions discussed    Patient disposition:   Return in about 2 months (around 11/21/2020) for Vision, Pressure (surface check).       Wilfred Steward, PGY3  Ophthalmology Resident     Teaching statement:  Complete documentation of historical and exam elements from today's encounter can be found in the full encounter summary report (not reduplicated in this progress note). I personally obtained the chief complaint(s) and history of present illness.  I confirmed and edited as necessary the review of systems, past medical/surgical history, family history, social history, and examination findings as documented by others; and I examined the patient myself. I personally reviewed the relevant tests, images, and reports as documented above.     I formulated and edited as necessary the assessment and plan and discussed the findings and management plan with the patient and family.    Kia Griffin MD  Comprehensive Ophthalmology & Ocular Pathology  Department of Ophthalmology and Visual Neurosciences  [email or web address]  Pager [phone]

## 2020-11-09 ENCOUNTER — OFFICE VISIT (OUTPATIENT)
Dept: OPHTHALMOLOGY | Facility: CLINIC | Age: 65
End: 2020-11-09
Payer: MEDICARE

## 2020-11-09 DIAGNOSIS — H43.813 PVD (POSTERIOR VITREOUS DETACHMENT), BILATERAL: ICD-10-CM

## 2020-11-09 DIAGNOSIS — H02.883 MEIBOMIAN GLAND DYSFUNCTION (MGD) OF BOTH EYES: Primary | ICD-10-CM

## 2020-11-09 DIAGNOSIS — H25.13 NUCLEAR SCLEROSIS OF BOTH EYES: ICD-10-CM

## 2020-11-09 DIAGNOSIS — H04.123 DRY EYE SYNDROME OF BOTH EYES: ICD-10-CM

## 2020-11-09 DIAGNOSIS — Z98.890 HX OF LASIK: ICD-10-CM

## 2020-11-09 DIAGNOSIS — H02.886 MEIBOMIAN GLAND DYSFUNCTION (MGD) OF BOTH EYES: Primary | ICD-10-CM

## 2020-11-09 PROCEDURE — 99213 OFFICE O/P EST LOW 20 MIN: CPT | Mod: GC | Performed by: OPHTHALMOLOGY

## 2020-11-09 RX ORDER — FLUOROMETHOLONE 0.1 %
1 SUSPENSION, DROPS(FINAL DOSAGE FORM)(ML) OPHTHALMIC (EYE) 4 TIMES DAILY
Qty: 15 ML | Refills: 1 | Status: SHIPPED | OUTPATIENT
Start: 2020-11-09 | End: 2021-01-08

## 2020-11-09 RX ORDER — DOXYCYCLINE HYCLATE 50 MG/1
50 CAPSULE ORAL 2 TIMES DAILY
Qty: 180 CAPSULE | Refills: 1 | Status: SHIPPED | OUTPATIENT
Start: 2020-11-09 | End: 2021-01-08

## 2020-11-09 ASSESSMENT — EXTERNAL EXAM - RIGHT EYE: OD_EXAM: NORMAL

## 2020-11-09 ASSESSMENT — VISUAL ACUITY
METHOD: SNELLEN - LINEAR
OS_SC: 20/20
OD_SC: 20/15

## 2020-11-09 ASSESSMENT — EXTERNAL EXAM - LEFT EYE: OS_EXAM: NORMAL

## 2020-11-09 ASSESSMENT — CONF VISUAL FIELD
OD_NORMAL: 1
OS_NORMAL: 1

## 2020-11-09 NOTE — PATIENT INSTRUCTIONS
- Continue artificial tears to 4x/day  - Continue warm compresses 2x/day  - Continue lid hygiene daily with baby shampoo  - Continue Krill oil 1000 mg/day  - Begin doxycycline 100 mg 2x/day for 1 month, then reduce to 50 mg 2x/day for 1 month  - Begin FML drop 4x/day in both eyes for 1 week; then 3x/day for one week; then 2x/day for one week; then 1x/day for 1 week; then stop

## 2020-11-09 NOTE — PROGRESS NOTES
CC:  Film over vision      Interval History:  He hasn't noticed a change in his symptoms since the last visit.  He stopped taking maxitrol ointment because he didn't like the ointment/felt it made the film worse.  Still describing a film floating over the vision in both eyes.  He has floaters but feels these are different.  Worst in the mornings.  Using AT's 4x/day (inconsistently), WC's 2x/day, lid hygiene, Krill oil 1 g daily.    HPI:  65M with a history of LASIK p/w weeks to months of a film over the vision in his right eye.  It comes and goes, improving with blinking or artificial tears but only temporarily, and then it returns to his vision.  No significant pain or irritation, although he states that after his AT's wear off, the eyes sometimes feel dryer than they did before.  He states that this doesn't look like the floaters from his PVD.  His floaters resolved after PVD in December/January.  It is worst in mid-lighting environments but is never present in very bright or dark environments.       Review of systems for the eyes was negative other than the pertinent positives/negatives listed in the HPI.      Agree with technician note.    Assessment & Plan      Jose Antonio Talavera is a 65 year old male with the following diagnoses:     Encounter Diagnosis   Name Primary?     Meibomian gland dysfunction (MGD) of both eyes Yes     Noticing a film blurring vision in the right eye, clears with blinking, improves with artificial tears.  VA 20/20 ou. Mucousy-debris laden tear film.  Mild gland inspissation with telangiectasias, and now with injection despite adding WC's and krill oil.  Failed maxitrol ointment.      - Continue artificial tears to 4x/day  - Continue warm compresses 2x/day  - Continue lid hygiene daily with baby shampoo  - Continue Krill oil 1000 mg/day  - Begin doxycycline 100 mg 2x/day for 1 month, then reduce to 50 mg 2x/day for 1 month  - Begin FML drop 4x/day in both eyes for 1 week; then 3x/day for  one week; then 2x/day for one week; then 1x/day for 1 week; then stop  - Stop maxitrol ointment  - If still unimproved, consider punctal plugs v. Corneal evaluation?  He does have mild conj chalasis but doubtful it is driving his symptoms.  Did well with punctal plugs after LASIK a number of years prior  - Return precautions discussed     Patient disposition:   Return in about 2 months (around 1/9/2021) for Vision, Pressure, Dilation, Refraction.       Wilfred Steward, PGY3  Ophthalmology Resident    Teaching statement:  Complete documentation of historical and exam elements from today's encounter can be found in the full encounter summary report (not reduplicated in this progress note). I personally obtained the chief complaint(s) and history of present illness.  I confirmed and edited as necessary the review of systems, past medical/surgical history, family history, social history, and examination findings as documented by others; and I examined the patient myself. I personally reviewed the relevant tests, images, and reports as documented above.     I formulated and edited as necessary the assessment and plan and discussed the findings and management plan with the patient and family.    Kia Griffin MD  Comprehensive Ophthalmology & Ocular Pathology  Department of Ophthalmology and Visual Neurosciences  [email or web address]  Pager [phone]

## 2021-01-08 ENCOUNTER — OFFICE VISIT (OUTPATIENT)
Dept: OPHTHALMOLOGY | Facility: CLINIC | Age: 66
End: 2021-01-08
Attending: OPHTHALMOLOGY
Payer: MEDICARE

## 2021-01-08 ENCOUNTER — TRANSFERRED RECORDS (OUTPATIENT)
Dept: HEALTH INFORMATION MANAGEMENT | Facility: CLINIC | Age: 66
End: 2021-01-08

## 2021-01-08 DIAGNOSIS — H02.883 MEIBOMIAN GLAND DYSFUNCTION (MGD) OF BOTH EYES: ICD-10-CM

## 2021-01-08 DIAGNOSIS — H35.371 EPIRETINAL MEMBRANE (ERM) OF RIGHT EYE: Primary | ICD-10-CM

## 2021-01-08 DIAGNOSIS — H02.886 MEIBOMIAN GLAND DYSFUNCTION (MGD) OF BOTH EYES: ICD-10-CM

## 2021-01-08 DIAGNOSIS — H43.811 POSTERIOR VITREOUS DETACHMENT OF RIGHT EYE: ICD-10-CM

## 2021-01-08 PROCEDURE — 92134 CPTRZ OPH DX IMG PST SGM RTA: CPT | Performed by: STUDENT IN AN ORGANIZED HEALTH CARE EDUCATION/TRAINING PROGRAM

## 2021-01-08 PROCEDURE — 99213 OFFICE O/P EST LOW 20 MIN: CPT | Mod: GC | Performed by: STUDENT IN AN ORGANIZED HEALTH CARE EDUCATION/TRAINING PROGRAM

## 2021-01-08 PROCEDURE — G0463 HOSPITAL OUTPT CLINIC VISIT: HCPCS

## 2021-01-08 RX ORDER — DOXYCYCLINE HYCLATE 50 MG/1
50 CAPSULE ORAL 2 TIMES DAILY
Qty: 180 CAPSULE | Refills: 1 | Status: SHIPPED | OUTPATIENT
Start: 2021-01-08

## 2021-01-08 ASSESSMENT — VISUAL ACUITY
METHOD: SNELLEN - LINEAR
OS_SC+: -2
OD_SC+: +2
OS_SC: 20/20
OD_SC: 20/20

## 2021-01-08 ASSESSMENT — TONOMETRY
OD_IOP_MMHG: 12
OS_IOP_MMHG: 11
IOP_METHOD: ICARE

## 2021-01-08 ASSESSMENT — REFRACTION_MANIFEST
OS_ADD: +2.25
OS_CYLINDER: SPHERE
OS_SPHERE: PLANO
OD_SPHERE: PLANO
OD_ADD: +2.25
OD_CYLINDER: SPHERE

## 2021-01-08 ASSESSMENT — CUP TO DISC RATIO
OD_RATIO: 0.3
OS_RATIO: 0.3

## 2021-01-08 ASSESSMENT — CONF VISUAL FIELD
OD_NORMAL: 1
METHOD: COUNTING FINGERS
OS_NORMAL: 1

## 2021-01-08 ASSESSMENT — EXTERNAL EXAM - LEFT EYE: OS_EXAM: NORMAL

## 2021-01-08 ASSESSMENT — EXTERNAL EXAM - RIGHT EYE: OD_EXAM: NORMAL

## 2021-01-08 NOTE — PROGRESS NOTES
"Chief Complaint/Presenting Concern:  Film over vision OD    Interval History of Present Ocular Illness:   Mr. Talavera is a 65M with a history of LASIK each eye presenting for follow up of a film over his vision, mainly in the right eye.  This has been ongoing for months.  He was initially treated with artificial tears without much improvement.  In 9/2020, he had a trial of maxitrol ointment for MGD but felt it was uncomfortable and made his vision worse.  At his last visit, 11/2020, we started doxycycline 100 mg BID for 1 month and then 50 mg BID thereafter.  We also initiated a course of FML 4x/day with weekly taper.  He hasn't had any real benefit from these changes, and he states the film is constant, although improving     Interval Updates to Medical/Family/Social History:  C4/5/6 nerve impingement diagnosed last week.  Also diagnosed with LISA recently and started on a CPAP.      Relevant Review of Systems Updates:  No fevers, chills, cough.  Does have new cervical stiffness and pain.       Laboratory Testing:No new labs     Current eye related medications:    Artificial tears QID    Doxycycline 50 mg BID    Retina/Uveitis Imaging:   OCT Spectralis Macula January 8, 2021  right eye: Mild epiretinal membrane with blunted contour, no IRF  left eye: PHF partially attached, normal    Assessment:     1.  Epiretinal Membrane, right eye   Fine epiretinal membrane present on OCT Macula today.   VA is 20/20.  No metamorphopsia. As the \"film over the vision\" is mobile, symptoms more likely related to #2 although could be partially related to this as well    2. Posterior vitreous detachment of right eye  Suspect this is source of patient's vision symptoms.  Film is only in the right eye.  Although it improves occasionally with blinking, this effect is fleeting.     3. Meibomian gland dysfunction (MGD) of both eyes  Symptoms fluctuate but are manageable overall.  He started using a CPAP after his recent diagnosis of LISA.  " Signs stable on examination.        Plan/Recommendations:      Discussed findings with patient.  Explained that his ERM may partly be related to the film he's been experiencing for the past several months in the right eye in addition to posterior vitreous detachment (more likely partially from vitreous floater than solely ERM)     Discussed recommendation of observation given excellent visual acuity and patient understands and agrees.  Will monitor with OCT Macula in 6 months or sooner PRN    Treatment recommendations:  o Continue artificial tears to 4x/day  o Continue warm compresses 2x/day  o Continue lid hygiene daily with baby shampoo  o Continue doxycycline 50 mg 2x/day  o Begin celluvisc at bedtime both eyes    RTC 6 months, VT + OCT macula    Wilfred Steward, PGY3  Ophthalmology Resident    Attending Physician Attestation:  Complete documentation of historical and exam elements from today's encounter can be found in the full encounter summary report (not reduplicated in this progress note). I reviewed the chief complaint(s) and history of present illness, and  confirmed and edited as necessary the review of systems, past medical/surgical history, family history, social history, and examination findings as documented by others and the treating Resident Physician.    I examined the patient myself, discussed the findings, reviewed all ancillary testing data and modified these results and reports along with the assessment and plan with the Treating Resident Physician. I agree with the note as detailed above.   Joey Berger M.D.  Uveitis and Medical Retina  January 8, 2021

## 2021-01-08 NOTE — NURSING NOTE
Chief Complaints and History of Present Illnesses   Patient presents with     Follow Up     Meibomian gland dysfunction (MGD) of both eyes      Chief Complaint(s) and History of Present Illness(es)     Follow Up     Laterality: both eyes    Onset: gradual    Onset: months ago    Associated symptoms: floaters and photophobia.  Negative for eye pain, dryness, tearing and flashes    Treatments tried: artificial tears and eye drops    Pain scale: 0/10    Comments: Meibomian gland dysfunction (MGD) of both eyes               Comments     Pt states vision is stable since last visit.  Pt still sees a film over his eyes.  He has started to notice some light sensitivity for about 2 months.  Pt has been compliant with drops but has stopped taking krill oil.  Pt found out he has nerve impingement in vertebrae C 4,5, and 6 last week.    ADDISON Garcia January 8, 2021 9:27 AM

## 2021-04-25 ENCOUNTER — HEALTH MAINTENANCE LETTER (OUTPATIENT)
Age: 66
End: 2021-04-25

## 2021-06-30 ENCOUNTER — OFFICE VISIT (OUTPATIENT)
Dept: OPHTHALMOLOGY | Facility: CLINIC | Age: 66
End: 2021-06-30
Attending: OPHTHALMOLOGY
Payer: MEDICARE

## 2021-06-30 DIAGNOSIS — Z98.890 HX OF LASIK: ICD-10-CM

## 2021-06-30 DIAGNOSIS — H35.371 EPIRETINAL MEMBRANE (ERM) OF RIGHT EYE: Primary | ICD-10-CM

## 2021-06-30 DIAGNOSIS — H43.811 POSTERIOR VITREOUS DETACHMENT OF RIGHT EYE: ICD-10-CM

## 2021-06-30 DIAGNOSIS — H04.123 DRY EYE SYNDROME OF BOTH EYES: ICD-10-CM

## 2021-06-30 PROCEDURE — G0463 HOSPITAL OUTPT CLINIC VISIT: HCPCS

## 2021-06-30 PROCEDURE — 92134 CPTRZ OPH DX IMG PST SGM RTA: CPT | Mod: 26 | Performed by: OPHTHALMOLOGY

## 2021-06-30 PROCEDURE — 92014 COMPRE OPH EXAM EST PT 1/>: CPT | Mod: GC | Performed by: OPHTHALMOLOGY

## 2021-06-30 PROCEDURE — 92134 CPTRZ OPH DX IMG PST SGM RTA: CPT | Performed by: STUDENT IN AN ORGANIZED HEALTH CARE EDUCATION/TRAINING PROGRAM

## 2021-06-30 ASSESSMENT — CUP TO DISC RATIO
OS_RATIO: 0.3
OD_RATIO: 0.3

## 2021-06-30 ASSESSMENT — VISUAL ACUITY
OS_SC: 20/20
METHOD: SNELLEN - LINEAR
OD_SC: 20/20

## 2021-06-30 ASSESSMENT — CONF VISUAL FIELD
METHOD: COUNTING FINGERS
OD_NORMAL: 1
OS_NORMAL: 1

## 2021-06-30 ASSESSMENT — TONOMETRY
OD_IOP_MMHG: 10
IOP_METHOD: TONOPEN
OS_IOP_MMHG: 10

## 2021-06-30 ASSESSMENT — EXTERNAL EXAM - LEFT EYE: OS_EXAM: NORMAL

## 2021-06-30 ASSESSMENT — EXTERNAL EXAM - RIGHT EYE: OD_EXAM: NORMAL

## 2021-06-30 ASSESSMENT — SLIT LAMP EXAM - LIDS
COMMENTS: 1+ MGD
COMMENTS: 1+ MGD

## 2021-06-30 NOTE — NURSING NOTE
Chief Complaints and History of Present Illnesses   Patient presents with     Follow Up     5.5 month follow up ERM, right eye     Chief Complaint(s) and History of Present Illness(es)     Follow Up     Laterality: right eye    Course: stable    Associated symptoms: floaters and redness.  Negative for eye pain and flashes    Pain scale: 0/10    Comments: 5.5 month follow up ERM, right eye              Comments     Pt denies any significant vision changes RE since last visit.  Pt notes he is still getting redness in corner of LE.  Hx of floaters- feels there might be more than normal.  Denies any flashes, pain, or irritation.  Ocular meds: Refresh PRN BE & Lumify PRN BE    Kyung Holcomb OT 9:31 AM June 30, 2021

## 2021-06-30 NOTE — PROGRESS NOTES
"Chief Complaint/Presenting Concern:  Film over vision OD    Interval History of Present Ocular Illness:   Mr. Talavera is a 66M with a history of LASIK each eye presenting for follow up of a film over his vision, mainly in the right eye.  On 1/8/2021, an ERM right eye only was discovered which was the likely cause of the film in his vision.  He thinks this film and the vision are unchanged (occasional problems reading at near and needs OTC readers).  However, his floaters in the right eye have worsened over the last 2-3 months.  He thinks there are more of them.  No flashes.  No field cuts.      Celluvisc didn't help much.  Doxy prescription ran out and didn't help much so he has stopped.  Refresh intermittently.  He also uses lumify occasionally.  His symptoms seem moderately well controlled during summer.         Current eye related medications:    Artificial tears QID        Retina/Uveitis Imaging:   OCT Macula 6/30/2021  Right eye stable mild ERM  Left eye normal    Assessment:     1.  Epiretinal Membrane, right eye   Fine epiretinal membrane present on OCT Macula today, stable from 1/2021.   VA is 20/20.  No metamorphopsia. As the \"film over the vision\" is mobile, symptoms more likely related to #2 although could be partially related to this as well    2. Posterior vitreous detachment of right eye  Suspect this is source of patient's vision symptoms.  Film is only in the right eye.  Although it improves occasionally with blinking, this effect is fleeting.  Floaters are slightly worse since last visit, no flashes, no holes/breaks/tears on exam.    3. Meibomian gland dysfunction (MGD) of both eyes  Symptoms fluctuate but are manageable overall.  He started using a CPAP after his recent diagnosis of LISA.  Signs stable on examination.        Plan/Recommendations:      Discussed findings with patient.  Explained that his ERM may partly be related to the film he's been experiencing for the past 9 months in the right " eye in addition to posterior vitreous detachment (more likely partially from vitreous floater than solely ERM)     Discussed recommendation of observation given excellent visual acuity and stable appearance on OCT Macula today and patient understands and agrees.  Will monitor with dilated examination in 12 months or sooner PRN    Treatment recommendations:  o Continue artificial tears to 4x/day  o Continue warm compresses 2x/day  o Continue lid hygiene daily with baby shampoo  o Stop doxycycline 50 mg 2x/day   o Ointment at bedtime as needed    RTC 12 months, VTD     Wilfred Steward, PGY3  Ophthalmology Resident    Teaching statement:  Complete documentation of historical and exam elements from today's encounter can be found in the full encounter summary report (not reduplicated in this progress note). I personally obtained the chief complaint(s) and history of present illness.  I confirmed and edited as necessary the review of systems, past medical/surgical history, family history, social history, and examination findings as documented by others; and I examined the patient myself. I personally reviewed the relevant tests, images, and reports as documented above.     I formulated and edited as necessary the assessment and plan and discussed the findings and management plan with the patient and family.    Kia Griffin MD  Comprehensive Ophthalmology & Ocular Pathology  Department of Ophthalmology and Visual Neurosciences  [email or web address]  Pager [phone]

## 2021-10-10 ENCOUNTER — HEALTH MAINTENANCE LETTER (OUTPATIENT)
Age: 66
End: 2021-10-10

## 2022-05-21 ENCOUNTER — HEALTH MAINTENANCE LETTER (OUTPATIENT)
Age: 67
End: 2022-05-21

## 2022-09-18 ENCOUNTER — HEALTH MAINTENANCE LETTER (OUTPATIENT)
Age: 67
End: 2022-09-18

## 2023-06-04 ENCOUNTER — HEALTH MAINTENANCE LETTER (OUTPATIENT)
Age: 68
End: 2023-06-04

## 2024-07-14 ENCOUNTER — HEALTH MAINTENANCE LETTER (OUTPATIENT)
Age: 69
End: 2024-07-14